# Patient Record
Sex: FEMALE | Race: BLACK OR AFRICAN AMERICAN | Employment: FULL TIME | URBAN - METROPOLITAN AREA
[De-identification: names, ages, dates, MRNs, and addresses within clinical notes are randomized per-mention and may not be internally consistent; named-entity substitution may affect disease eponyms.]

---

## 2017-03-17 ENCOUNTER — OFFICE VISIT (OUTPATIENT)
Dept: OBGYN CLINIC | Age: 27
End: 2017-03-17

## 2017-03-17 VITALS
WEIGHT: 204 LBS | SYSTOLIC BLOOD PRESSURE: 109 MMHG | DIASTOLIC BLOOD PRESSURE: 73 MMHG | HEART RATE: 96 BPM | BODY MASS INDEX: 34.83 KG/M2 | HEIGHT: 64 IN

## 2017-03-17 DIAGNOSIS — Z11.3 SCREENING FOR VENEREAL DISEASE: ICD-10-CM

## 2017-03-17 DIAGNOSIS — Z32.02 NEGATIVE PREGNANCY TEST: ICD-10-CM

## 2017-03-17 DIAGNOSIS — Z01.419 ENCOUNTER FOR WELL WOMAN EXAM WITH ROUTINE GYNECOLOGICAL EXAM: Primary | ICD-10-CM

## 2017-03-17 DIAGNOSIS — N92.6 IRREGULAR MENSES: ICD-10-CM

## 2017-03-17 LAB
HCG URINE, QL. (POC): NEGATIVE
VALID INTERNAL CONTROL?: YES

## 2017-03-17 RX ORDER — MEDROXYPROGESTERONE ACETATE 10 MG/1
10 TABLET ORAL DAILY
Qty: 10 TAB | Refills: 0 | Status: SHIPPED | OUTPATIENT
Start: 2017-03-17 | End: 2017-03-27

## 2017-03-17 RX ORDER — LEVONORGESTREL AND ETHINYL ESTRADIOL 0.15-0.03
1 KIT ORAL DAILY
Qty: 3 TAB | Refills: 4 | Status: SHIPPED | OUTPATIENT
Start: 2017-03-17

## 2017-03-17 NOTE — PATIENT INSTRUCTIONS

## 2017-03-17 NOTE — PROGRESS NOTES
Annual exam ages 21-44    Aida Cohn is a ,  32 y.o. female [address] Patient's last menstrual period was 2017., who presents for her annual checkup. Withdrawal bleed came at end of pill pack as expected. Ran out (overdue for AE). Previously was probably not taking OCPs consistently, doing better now. Would like to resume. Last IC 2wks ago. Since her last annual GYN exam about one year ago on 3/15/16, she has had the following changes in her health history: None    ADDITIONAL CONCERNS:  She is having amenorrhea. Ran out of OCP for this month. Did not call to refill. With regard to the Gardasil vaccine, she is older than the FDA approved age to receive it. Menstrual status:    Her periods are light in flow. She is using one to two pads or tampons per day, usually regular and last 26-30 days. She denies dysmenorrhea. She denies premenstrual symptoms. Contraception:    The current method of family planning is condoms sometimes. Sexual history:     She  reports that she currently engages in sexual activity and has had male partners. She reports using the following methods of birth control/protection: Condom and Pill. .        Pap and Mammogram History:    Her most recent Pap smear was normal, HPV was not indicated, obtained 2 year(s) ago on 1/20/15. She does not have a history of abnormal paps.     The patient has never had a mammogram.    Breast Cancer History/Substance Abuse:    Past Medical History:   Diagnosis Date    Asthma     Hx of trichomoniasis Treated at age 12    Pap smear for cervical cancer screening 1/20/15    negative (media tab)    Trichomonas infection 2016    Dx'd at [facility] and was given Rx- Flagyl     Past Surgical History:   Procedure Laterality Date    HX HYSTEROSCOPY  16    Hysteroscopy, D and C, and polypectomy with     OB History    Para Term  AB SAB TAB Ectopic Multiple Living   0 0 0 0 0 0 0 0 0 0             Current Outpatient Prescriptions   Medication Sig Dispense Refill    medroxyPROGESTERone (PROVERA) 10 mg tablet Take 1 Tab by mouth daily for 10 days. 10 Tab 0    levonorgestrel-ethinyl estradiol (ANIBAL) 0.15-0.03 mg tab Take 1 Tab by mouth daily. 3 Tab 4    ASPIRIN/SALICYLAMIDE/CAFFEINE (BC HEADACHE POWDER PO) Take 1 Package by mouth. Takes for headache or menstrual cramps      VENTOLIN HFA 90 mcg/actuation inhaler   0    metroNIDAZOLE (FLAGYL) 500 mg tablet Take 4 tabs by mouth for 1 dose. 4 Tab 0    metroNIDAZOLE (FLAGYL) 500 mg tablet take 1 tablet by mouth every 12 hours -NO ALCOHOL WHILE TAKING MEDICINE  0    oxyCODONE-acetaminophen (PERCOCET) 5-325 mg per tablet Take 1-2 Tabs by mouth every four (4) hours as needed for Pain. Max Daily Amount: 12 Tabs. 5 Tab 0    ibuprofen (MOTRIN) 800 mg tablet   0     Allergies: Penicillins   Social History     Social History    Marital status: SINGLE     Spouse name: N/A    Number of children: N/A    Years of education: N/A     Occupational History    Not on file. Social History Main Topics    Smoking status: Former Smoker     Types: Cigarettes    Smokeless tobacco: Never Used      Comment: Never used vapor or e-cigs     Alcohol use 0.6 oz/week     1 Cans of beer per week      Comment: occasional    Drug use: No    Sexual activity: Yes     Partners: Male     Birth control/ protection: Condom, Pill     Other Topics Concern    Not on file     Social History Narrative     Tobacco History:  reports that she has quit smoking. Her smoking use included Cigarettes. She has never used smokeless tobacco.  Alcohol Abuse:  reports that she drinks about 0.6 oz of alcohol per week   Drug Abuse:  reports that she does not use illicit drugs. There is no problem list on file for this patient.     Family History   Problem Relation Age of Onset    Cancer Father      Throat CA       Review of Systems - History obtained from the patient  Constitutional: negative for weight loss, fever, night sweats  HEENT: negative for hearing loss, earache, congestion, snoring, sorethroat  CV: negative for chest pain, palpitations, edema  Resp: negative for cough, shortness of breath, wheezing  GI: negative for change in bowel habits, abdominal pain, black or bloody stools  : negative for frequency, dysuria, hematuria, vaginal discharge  MSK: negative for back pain, joint pain, muscle pain  Breast: negative for breast lumps, nipple discharge, galactorrhea  Skin :negative for itching, rash, hives  Neuro: negative for dizziness, headache, confusion, weakness  Psych: negative for anxiety, depression, change in mood  Heme/lymph: negative for bleeding, bruising, pallor    Physical Exam    Visit Vitals    /73    Pulse 96    Ht 5' 4\" (1.626 m)    Wt 204 lb (92.5 kg)    LMP 02/03/2017  Comment: Until 2/6/17    BMI 35.02 kg/m2       Constitutional  · Appearance: well-nourished, well developed, alert, in no acute distress    HENT  · Head and Face: appears normal    Neck  · Inspection/Palpation: normal appearance, no masses or tenderness  · Lymph Nodes: no lymphadenopathy present  · Thyroid: gland size normal, nontender, no nodules or masses present on palpation    Chest  · Respiratory Effort: breathing unlabored  · Auscultation: normal breath sounds    Cardiovascular  · Heart:  · Auscultation: regular rate and rhythm without murmur    Breasts  · Inspection of Breasts: breasts symmetrical, no skin changes, no discharge present, nipple appearance normal, no skin retraction present  · Palpation of Breasts and Axillae: no masses present on palpation, no breast tenderness  · Axillary Lymph Nodes: no lymphadenopathy present    Gastrointestinal  · Abdominal Examination: abdomen non-tender to palpation, normal bowel sounds, no masses present  · Liver and spleen: no hepatomegaly present, spleen not palpable  · Hernias: no hernias identified    Genitourinary  · External Genitalia: normal appearance for age, no discharge present, no tenderness present, no inflammatory lesions present, no masses present, no atrophy present  · Vagina: normal vaginal vault without central or paravaginal defects, no discharge present, no inflammatory lesions present, no masses present  · Bladder: non-tender to palpation  · Urethra: appears normal  · Cervix: normal   · Uterus: normal size, shape and consistency  · Adnexa: no adnexal tenderness present, no adnexal masses present  · Perineum: perineum within normal limits, no evidence of trauma, no rashes or skin lesions present  · Anus: anus within normal limits, no hemorrhoids present  · Inguinal Lymph Nodes: no lymphadenopathy present    Skin  · General Inspection: no rash, no lesions identified    Neurologic/Psychiatric  · Mental Status:  · Orientation: grossly oriented to person, place and time  · Mood and Affect: mood normal, affect appropriate      Results for orders placed or performed in visit on 03/17/17   AMB POC URINE PREGNANCY TEST, VISUAL COLOR COMPARISON   Result Value Ref Range    VALID INTERNAL CONTROL POC Yes     HCG urine, Ql. (POC) Negative Negative         Assessment & Plan:  · Routine gynecologic examination. Pap done. · Oligomenorrhea. Last IC >2wks ago. eRX Provera x10d, then start OCPs. 1st Sunday start, backup method until second pack. Aureliano Sana #3 RFx4  · H/o +trich, NuSwab today. · Will draw HIV, T.pallidum, HBsAg, HepC.   · Counseled re: diet, exercise, healthy lifestyle  · Return for yearly wellness visits  · Patient verbalized understanding    Orders Placed This Encounter    CT/NG/T.VAGINALIS AMPLIFICATION     Order Specific Question:   Specimen type     Answer:   Vaginal [516]    HEP B SURFACE AG    HEPATITIS C AB    T PALLIDUM SCREEN W/REFLEX    HIV 1/2 AG/AB, 4TH GENERATION,W RFLX CONFIRM    AMB POC URINE PREGNANCY TEST, VISUAL COLOR COMPARISON    medroxyPROGESTERone (PROVERA) 10 mg tablet     Sig: Take 1 Tab by mouth daily for 10 days. Dispense:  10 Tab     Refill:  0    levonorgestrel-ethinyl estradiol (ANIBAL) 0.15-0.03 mg tab     Sig: Take 1 Tab by mouth daily.      Dispense:  3 Tab     Refill:  4    PAP, IG, RFX HPV ASCUS ([id number])

## 2017-03-20 LAB
C TRACH RRNA SPEC QL NAA+PROBE: NEGATIVE
N GONORRHOEA RRNA SPEC QL NAA+PROBE: NEGATIVE
T VAGINALIS RRNA SPEC QL NAA+PROBE: NEGATIVE

## 2017-03-21 LAB
HBV SURFACE AG SERPL QL IA: NEGATIVE
HCV AB S/CO SERPL IA: 0.2 S/CO RATIO (ref 0–0.9)
HIV 1+2 AB+HIV1 P24 AG SERPL QL IA: NON REACTIVE
T PALLIDUM AB SER QL IA: NEGATIVE

## 2017-03-22 LAB
CYTOLOGIST CVX/VAG CYTO: NORMAL
CYTOLOGY CVX/VAG DOC THIN PREP: NORMAL
DX ICD CODE: NORMAL
LABCORP, 190119: NORMAL
Lab: NORMAL
Lab: NORMAL
OTHER STN SPEC: NORMAL
PATH REPORT.FINAL DX SPEC: NORMAL
STAT OF ADQ CVX/VAG CYTO-IMP: NORMAL

## 2017-05-08 ENCOUNTER — OFFICE VISIT (OUTPATIENT)
Dept: OBGYN CLINIC | Age: 27
End: 2017-05-08

## 2017-05-08 VITALS
BODY MASS INDEX: 35 KG/M2 | SYSTOLIC BLOOD PRESSURE: 98 MMHG | DIASTOLIC BLOOD PRESSURE: 71 MMHG | WEIGHT: 205 LBS | HEIGHT: 64 IN | HEART RATE: 107 BPM

## 2017-05-08 DIAGNOSIS — R10.12 LEFT UPPER QUADRANT PAIN: ICD-10-CM

## 2017-05-08 DIAGNOSIS — R31.9 HEMATURIA: ICD-10-CM

## 2017-05-08 DIAGNOSIS — M54.50 BILATERAL LOW BACK PAIN WITHOUT SCIATICA, UNSPECIFIED CHRONICITY: ICD-10-CM

## 2017-05-08 DIAGNOSIS — R10.31 ABDOMINAL PAIN, BILATERAL LOWER QUADRANT: Primary | ICD-10-CM

## 2017-05-08 DIAGNOSIS — R10.32 ABDOMINAL PAIN, BILATERAL LOWER QUADRANT: Primary | ICD-10-CM

## 2017-05-08 LAB
BILIRUB UR QL STRIP: NEGATIVE
GLUCOSE UR-MCNC: NEGATIVE MG/DL
KETONES P FAST UR STRIP-MCNC: NEGATIVE MG/DL
PH UR STRIP: 4.6 [PH] (ref 4.6–8)
PROT UR QL STRIP: NEGATIVE MG/DL
SP GR UR STRIP: 1 (ref 1–1.03)
UA UROBILINOGEN AMB POC: NORMAL (ref 0.2–1)
URINALYSIS CLARITY POC: NORMAL
URINALYSIS COLOR POC: NORMAL
URINE BLOOD POC: NORMAL
URINE LEUKOCYTES POC: NEGATIVE
URINE NITRITES POC: NEGATIVE

## 2017-05-08 NOTE — PROGRESS NOTES
UTI note    Bushra Galvan is a [de-identified] ,  32 y.o. female [address] who presents today with had concerns including Bladder Infection. .     Her symptoms started 2+ weeks ago, unchanged since that time. Abdominal pain. Has been BLQ. Today RICO and LUQ. Urine is darker color. Feels that she drinks a lot of water. Patient's other complaints: back pain. Her symptoms are moderate. Low back pain. Feels achey. +diarrhea last cople of days. Patient denies fever, chills, N/V. No vaginal discharge, itching, odor. There is not any concern of sexual abuse. There is not a history of trauma to the genital area. Patient does not have a history of recurrent UTI. She does not have a history of pyelonephritis. She has a history of  has a past medical history of Asthma; Pap smear for cervical cancer screening (03/17/2017); and Trichomonas infection (12/13/2016). with the following surgical history  has a past surgical history that includes hysteroscopy (5/4/16). .  . She has not been evaluated for her current complaints. Has appt scheduled with her PCP this Wednesday (2d)    Urine dipstick shows positive for red blood cells. No results found for this or any previous visit. Past Medical History:   Diagnosis Date    Asthma     Pap smear for cervical cancer screening 03/17/2017    Negative (no hpv)     Trichomonas infection 12/13/2016    Dx'd at [facility] and was given Rx- Flagyl. Prev tx'd @ 17yo. Past Surgical History:   Procedure Laterality Date    HX HYSTEROSCOPY  5/4/16    Hysteroscopy, D and C, and polypectomy with     Social History     Occupational History    Not on file.      Social History Main Topics    Smoking status: Current Some Day Smoker     Types: Cigarettes    Smokeless tobacco: Never Used      Comment: Never used vapor or e-cigs     Alcohol use 0.6 oz/week     1 Cans of beer per week      Comment: occasional    Drug use: No    Sexual activity: Yes Partners: Male     Birth control/ protection: Condom, Pill     Family History   Problem Relation Age of Onset    Cancer Father      Throat CA       Allergies   Allergen Reactions    Penicillins Unable to Obtain     Yeast infections     Prior to Admission medications    Medication Sig Start Date End Date Taking? Authorizing Provider   levonorgestrel-ethinyl estradiol (ANIBAL) 0.15-0.03 mg tab Take 1 Tab by mouth daily. 3/17/17  Yes [address] MD   ASPIRIN/SALICYLAMIDE/CAFFEINE (BC HEADACHE POWDER PO) Take 1 Package by mouth. Takes for headache or menstrual cramps   Yes Historical Provider   VENTOLIN HFA 90 mcg/actuation inhaler  1/8/16  Yes Historical Provider   metroNIDAZOLE (FLAGYL) 500 mg tablet Take 4 tabs by mouth for 1 dose. 12/21/16   [address] MD   metroNIDAZOLE (FLAGYL) 500 mg tablet take 1 tablet by mouth every 12 hours -NO ALCOHOL WHILE TAKING MEDICINE 12/13/16   Historical Provider   oxyCODONE-acetaminophen (PERCOCET) 5-325 mg per tablet Take 1-2 Tabs by mouth every four (4) hours as needed for Pain. Max Daily Amount: 12 Tabs. 5/4/16   [address], MD   ibuprofen (MOTRIN) 800 mg tablet  1/13/16   Historical Provider        Review of Systems: History obtained from the patient  Constitutional: negative for weight loss, fever, night sweats  Breast: negative for breast lumps, nipple discharge, galactorrhea  GI: see HPI  : see HPI, negative for vaginal discharge  MSK:see HPI  Skin: negative for itching, rash, hives  Psych: negative for anxiety, depression, change in mood      Objective:    Visit Vitals    BP 98/71    Pulse (!) 107    Ht 5' 4\" (1.626 m)    Wt 205 lb (93 kg)    LMP 04/12/2017    BMI 35.19 kg/m2       Physical Exam:   PHYSICAL EXAMINATION    Constitutional  · Appearance: well-nourished, well developed, alert, in no acute distress    Gastrointestinal  · Abdominal Examination: mild RICO and LUQ tenderness to palpation. No guarding or rebound.  Normal bowel sounds, no masses present  · Liver and spleen: no hepatomegaly present, spleen not palpable  · Hernias: no hernias identified      Back  · No CVAT; minimal flank tenderness bilaterally (\"achey\")    Genitourinary  · External Genitalia: normal appearance for age, no discharge present, no tenderness present, no inflammatory lesions present, no masses present, no atrophy present  · Vagina: normal vaginal vault without central or paravaginal defects, no discharge present, no inflammatory lesions present, no masses present  · Bladder: mildly tender to palpation  · Urethra: appears normal  · Cervix: normal, no CMT   · Uterus: normal size, shape and consistency; RV, +/- minimal tenderness (initially reported some tenderness to palpation, but not able to reproduce)  · Adnexa: no adnexal tenderness present, no adnexal masses present  · Perineum: perineum within normal limits, no evidence of trauma, no rashes or skin lesions present  · Anus: anus within normal limits, no hemorrhoids present  · Inguinal Lymph Nodes: no lymphadenopathy present    Skin  · General Inspection: no rash, no lesions identified    Neurologic/Psychiatric  · Mental Status:  · Orientation: grossly oriented to person, place and time  · Mood and Affect: mood normal, affect appropriate    Results for orders placed or performed in visit on 05/08/17   AMB POC URINALYSIS DIP STICK AUTO W/O MICRO   Result Value Ref Range    Color (UA POC) Bev     Clarity (UA POC) Slightly Cloudy     Glucose (UA POC) Negative Negative    Bilirubin (UA POC) Negative Negative    Ketones (UA POC) Negative Negative    Specific gravity (UA POC) 1.001 1.001 - 1.035    Blood (UA POC) Trace Negative    pH (UA POC) 4.6 4.6 - 8.0    Protein (UA POC) Negative Negative mg/dL    Urobilinogen (UA POC) normal 0.2 - 1    Nitrites (UA POC) Negative Negative    Leukocyte esterase (UA POC) Negative Negative         Assessment:   Abdominal pain  Back pain    Plan:   - UA/C&S  - Nuswab G/C/T

## 2017-05-08 NOTE — PATIENT INSTRUCTIONS
Urinary Tract Infection in Women: Care Instructions  Your Care Instructions    A urinary tract infection, or UTI, is a general term for an infection anywhere between the kidneys and the urethra (where urine comes out). Most UTIs are bladder infections. They often cause pain or burning when you urinate. UTIs are caused by bacteria and can be cured with antibiotics. Be sure to complete your treatment so that the infection goes away. Follow-up care is a key part of your treatment and safety. Be sure to make and go to all appointments, and call your doctor if you are having problems. It's also a good idea to know your test results and keep a list of the medicines you take. How can you care for yourself at home? · Take your antibiotics as directed. Do not stop taking them just because you feel better. You need to take the full course of antibiotics. · Drink extra water and other fluids for the next day or two. This may help wash out the bacteria that are causing the infection. (If you have kidney, heart, or liver disease and have to limit fluids, talk with your doctor before you increase your fluid intake.)  · Avoid drinks that are carbonated or have caffeine. They can irritate the bladder. · Urinate often. Try to empty your bladder each time. · To relieve pain, take a hot bath or lay a heating pad set on low over your lower belly or genital area. Never go to sleep with a heating pad in place. To prevent UTIs  · Drink plenty of water each day. This helps you urinate often, which clears bacteria from your system. (If you have kidney, heart, or liver disease and have to limit fluids, talk with your doctor before you increase your fluid intake.)  · Urinate when you need to. · Urinate right after you have sex. · Change sanitary pads often. · Avoid douches, bubble baths, feminine hygiene sprays, and other feminine hygiene products that have deodorants.   · After going to the bathroom, wipe from front to back.  When should you call for help? Call your doctor now or seek immediate medical care if:  · Symptoms such as fever, chills, nausea, or vomiting get worse or appear for the first time. · You have new pain in your back just below your rib cage. This is called flank pain. · There is new blood or pus in your urine. · You have any problems with your antibiotic medicine. Watch closely for changes in your health, and be sure to contact your doctor if:  · You are not getting better after taking an antibiotic for 2 days. · Your symptoms go away but then come back. Where can you learn more? Go to http://peter-danilo.info/. Enter X420 in the search box to learn more about \"Urinary Tract Infection in Women: Care Instructions. \"  Current as of: November 28, 2016  Content Version: 11.2  © 1479-3231 [email or web address], [employer]. Care instructions adapted under license by [employer] (which disclaims liability or warranty for this information). If you have questions about a medical condition or this instruction, always ask your healthcare professional. [address] any warranty or liability for your use of this information.

## 2017-05-10 ENCOUNTER — OFFICE VISIT (OUTPATIENT)
Dept: FAMILY MEDICINE CLINIC | Age: 27
End: 2017-05-10

## 2017-05-10 VITALS
WEIGHT: 209 LBS | RESPIRATION RATE: 16 BRPM | HEIGHT: 64 IN | DIASTOLIC BLOOD PRESSURE: 78 MMHG | HEART RATE: 80 BPM | OXYGEN SATURATION: 98 % | TEMPERATURE: 98.2 F | SYSTOLIC BLOOD PRESSURE: 105 MMHG | BODY MASS INDEX: 35.68 KG/M2

## 2017-05-10 DIAGNOSIS — R14.1 ABDOMINAL GAS PAIN: Primary | ICD-10-CM

## 2017-05-10 DIAGNOSIS — Z76.89 ENCOUNTER TO ESTABLISH CARE: ICD-10-CM

## 2017-05-10 DIAGNOSIS — Z01.419 ROUTINE GYNECOLOGICAL EXAMINATION: ICD-10-CM

## 2017-05-10 LAB
APPEARANCE UR: CLEAR
BACTERIA UR CULT: NORMAL
BILIRUB UR QL STRIP: NEGATIVE
C TRACH RRNA SPEC QL NAA+PROBE: NEGATIVE
COLOR UR: YELLOW
GLUCOSE UR QL: NEGATIVE
HGB UR QL STRIP: NEGATIVE
KETONES UR QL STRIP: NEGATIVE
LEUKOCYTE ESTERASE UR QL STRIP: NEGATIVE
MICRO URNS: NORMAL
N GONORRHOEA RRNA SPEC QL NAA+PROBE: NEGATIVE
NITRITE UR QL STRIP: NEGATIVE
PH UR STRIP: 7 [PH] (ref 5–7.5)
PROT UR QL STRIP: NORMAL
SP GR UR: 1.03 (ref 1–1.03)
T VAGINALIS RRNA SPEC QL NAA+PROBE: NEGATIVE
UROBILINOGEN UR STRIP-MCNC: 1 MG/DL (ref 0.2–1)

## 2017-05-10 NOTE — MR AVS SNAPSHOT
Visit Information Date & Time Provider Department Dept. Phone Encounter #  
 5/10/2017  9:00 AM Orlando Crouch Donnie [phone] [id number] Follow-up Instructions Return if symptoms worsen or fail to improve. Your Appointments 3/19/2018  9:00 AM  
ESTABLISHED PATIENT with Camron Agarwal MD  
Gerson Gagnon ([facility]) Appt Note: AE  
 [address] [facility] [address] [facility] [id number]  
[facility] [address] Upcoming Health Maintenance Date Due  
 HPV AGE 9Y-34Y (1 of 3 - Female 3 Dose Series) 5/28/2001 Pneumococcal 19-64 Medium Risk (1 of 1 - PPSV23) 5/28/2009 DTaP/Tdap/Td series (1 - Tdap) 5/28/2011 INFLUENZA AGE 9 TO ADULT 8/1/2017 PAP AKA CERVICAL CYTOLOGY 3/17/2020 Allergies as of 5/10/2017  Review Complete On: 5/10/2017 By: Emiliano Snow MD  
  
 Severity Noted Reaction Type Reactions Penicillins Medium 03/10/2016   Side Effect Unable to Obtain Yeast infections Current Immunizations  Never Reviewed No immunizations on file. Not reviewed this visit You Were Diagnosed With   
  
 Codes Comments Abdominal gas pain    -  Primary ICD-10-CM: R14.1 ICD-9-CM: 787.3 Routine gynecological examination     ICD-10-CM: P58.667 ICD-9-CM: V72.31 Encounter to establish care     ICD-10-CM: Z76.89 
ICD-9-CM: V65.8 Vitals BP Pulse Temp Resp Height(growth percentile) Weight(growth percentile) 105/78 (BP 1 Location: Left arm, BP Patient Position: Sitting) 80 98.2 °F (36.8 °C) (Oral) 16 5' 4\" (1.626 m) 209 lb (94.8 kg) LMP SpO2 BMI OB Status Smoking Status 04/12/2017 98% 35.87 kg/m2 Having regular periods Current Some Day Smoker Vitals History BMI and BSA Data Body Mass Index Body Surface Area  
 35.87 kg/m 2 2.07 m 2 Preferred Pharmacy Pharmacy Name Phone [address] [facility]  [phone] Your Updated Medication List  
  
   
This list is accurate as of: 5/10/17  9:30 AM.  Always use your most recent med list.  
  
  
  
  
 BC HEADACHE POWDER PO Take 1 Package by mouth. Takes for headache or menstrual cramps  
  
 levonorgestrel-ethinyl estradiol 0.15-0.03 mg Tab Commonly known as:  Patricia Homme Take 1 Tab by mouth daily. VENTOLIN HFA 90 mcg/actuation inhaler Generic drug:  albuterol We Performed the Following REFERRAL TO GYNECOLOGY [REF30 Custom] Follow-up Instructions Return if symptoms worsen or fail to improve. Referral Information Referral ID Referred By Referred To  
  
 [id number] Lamar Vazquez MD   
   [facility] [address] [facility], [address] [place] [address] Pky Phone: [phone] Fax: [phone] Visits Status Start Date End Date 1 New Request 5/10/17 5/10/18 If your referral has a status of pending review or denied, additional information will be sent to support the outcome of this decision. Patient Instructions Simethicone - also known as Gas X. May try to see if this helps with your gas pains. Abdominal Pain: Care Instructions Your Care Instructions Abdominal pain has many possible causes. Some aren't serious and get better on their own in a few days. Others need more testing and treatment. If your pain continues or gets worse, you need to be rechecked and may need more tests to find out what is wrong. You may need surgery to correct the problem. Don't ignore new symptoms, such as fever, nausea and vomiting, urination problems, pain that gets worse, and dizziness. These may be signs of a more serious problem. Your doctor may have recommended a follow-up visit in the next 8 to 12 hours. If you are not getting better, you may need more tests or treatment. The doctor has checked you carefully, but problems can develop later. If you notice any problems or new symptoms, get medical treatment right away. Follow-up care is a key part of your treatment and safety. Be sure to make and go to all appointments, and call your doctor if you are having problems. It's also a good idea to know your test results and keep a list of the medicines you take. How can you care for yourself at home? · Rest until you feel better. · To prevent dehydration, drink plenty of fluids, enough so that your urine is light yellow or clear like water. Choose water and other caffeine-free clear liquids until you feel better. If you have kidney, heart, or liver disease and have to limit fluids, talk with your doctor before you increase the amount of fluids you drink. · If your stomach is upset, eat mild foods, such as rice, dry toast or crackers, bananas, and applesauce. Try eating several small meals instead of two or three large ones. · Wait until 48 hours after all symptoms have gone away before you have spicy foods, alcohol, and drinks that contain caffeine. · Do not eat foods that are high in fat. · Avoid anti-inflammatory medicines such as aspirin, ibuprofen (Advil, Motrin), and naproxen (Aleve). These can cause stomach upset. Talk to your doctor if you take daily aspirin for another health problem. When should you call for help? Call 911 anytime you think you may need emergency care. For example, call if: 
· You passed out (lost consciousness). · You pass maroon or very bloody stools. · You vomit blood or what looks like coffee grounds. · You have new, severe belly pain. Call your doctor now or seek immediate medical care if: 
· Your pain gets worse, especially if it becomes focused in one area of your belly. · You have a new or higher fever. · Your stools are black and look like tar, or they have streaks of blood. · You have unexpected vaginal bleeding. · You have symptoms of a urinary tract infection. These may include: 
¨ Pain when you urinate. ¨ Urinating more often than usual. 
¨ Blood in your urine. · You are dizzy or lightheaded, or you feel like you may faint. Watch closely for changes in your health, and be sure to contact your doctor if: 
· You are not getting better after 1 day (24 hours). Where can you learn more? Go to http://peter-danilo.info/. Enter W606 in the search box to learn more about \"Abdominal Pain: Care Instructions. \" Current as of: May 27, 2016 Content Version: 11.2 © 2095-8344 oNoise. Care instructions adapted under license by [employer] (which disclaims liability or warranty for this information). If you have questions about a medical condition or this instruction, always ask your healthcare professional. [address] any warranty or liability for your use of this information. Introducing [facility] [place] [facility]! Dear Ricardo Gonzalez: Thank you for requesting a [employer] account. Our records indicate that you already have an active [employer] account. You can access your account anytime at [email or web address] Did you know that you can access your hospital and ER discharge instructions at any time in [employer]? You can also review all of your test results from your hospital stay or ER visit. Additional Information If you have questions, please visit the Frequently Asked Questions section of the [employer] website at [email or web address] Remember, [employer] is NOT to be used for urgent needs. For medical emergencies, dial 911. Now available from your iPhone and Android! Please provide this summary of care documentation to your next provider. Your primary care clinician is listed as Tati Reyes. If you have any questions after today's visit, please call [phone].

## 2017-05-10 NOTE — PROGRESS NOTES
Subjective:      Meseret López is a 32 y.o. female here today to establish care and needs referral to Gynecology. States that she was informed by her Gynecologist's office that a referral from her PCP is needed. In addition. Patient reports that for the past 3 days she has had \"gas stuck on my stomach\". Intermittent. She is passing flatus. Located in the lower part of her stomach. Episodes will last for 30 seconds before self-resolving. Associated with constipation; reports that her last BM was yesterday morning. Reports that she typically has a bowel movement each morning which were easy to pass. She reports that she drinks plenty of water. Evaluated by on 5/8/17 by Dr. Chente Cox with negative urine culture and STI testing. She is eating and drinking well. Denies fever, chills, weight loss, nausea, vomiting. Current Outpatient Prescriptions   Medication Sig Dispense Refill    levonorgestrel-ethinyl estradiol (ANIBAL) 0.15-0.03 mg tab Take 1 Tab by mouth daily. 3 Tab 4    ASPIRIN/SALICYLAMIDE/CAFFEINE (BC HEADACHE POWDER PO) Take 1 Package by mouth. Takes for headache or menstrual cramps      VENTOLIN HFA 90 mcg/actuation inhaler   0       Allergies   Allergen Reactions    Penicillins Unable to Obtain     Yeast infections       Past Medical History:   Diagnosis Date    Asthma     Pap smear for cervical cancer screening 03/17/2017    Negative (no hpv)     Trichomonas infection 12/13/2016    Dx'd at [facility] and was given Rx- Flagyl. Prev tx'd @ 15yo. Social History   Substance Use Topics    Smoking status: Current Some Day Smoker     Types: Cigarettes    Smokeless tobacco: Never Used      Comment: Never used vapor or e-cigs     Alcohol use 0.6 oz/week     1 Cans of beer per week      Comment: occasional        Review of Systems  Pertinent items are noted in HPI.      Objective:     Visit Vitals    /78 (BP 1 Location: Left arm, BP Patient Position: Sitting)    Pulse 80    Temp 98.2 °F (36.8 °C) (Oral)    Resp 16    Ht 5' 4\" (1.626 m)    Wt 209 lb (94.8 kg)    LMP 04/12/2017    SpO2 98%    BMI 35.87 kg/m2      General appearance - alert, well appearing, and in no distress  Eyes - pupils equal and reactive, extraocular eye movements intact, sclera anicteric  Chest - clear to auscultation, no wheezes, rales or rhonchi, symmetric air entry, no tachypnea, retractions or cyanosis  Heart - normal rate, regular rhythm, normal S1, S2, no murmurs, rubs, clicks or gallops  Abdomen - soft, nontender, nondistended, no masses or organomegaly, normoactive bowel sounds   Neurological - alert, oriented, normal speech, no focal findings or movement disorder noted  Skin - warm and dry, normal color and turgor     Assessment/Plan:   Ebony Holcomb is a 32 y.o. female seen for:     1. Abdominal gas pain: no associated worrisome history or examination findings at this time. Trial of simethicone therapy to see if this helps alleviate her symptoms. Return for re-evaluation if symptoms persist or worsen. 2. Routine gynecological examination: has established care with Dr. Prateek Wade at [facility]. Referral provided. - REFERRAL TO GYNECOLOGY    3. Encounter to establish care    I have discussed the diagnosis with the patient and the intended plan as seen in the above orders. The patient has received an after-visit summary and questions were answered concerning future plans. I have discussed medication side effects and warnings with the patient as well. Patient verbalizes understanding of plan of care and denies further questions or concerns at this time. Informed patient to return to the office if symptoms worsen or if new symptoms arise. Follow-up Disposition:  Return if symptoms worsen or fail to improve.

## 2017-05-10 NOTE — PATIENT INSTRUCTIONS
Simethicone - also known as Gas X. May try to see if this helps with your gas pains. Abdominal Pain: Care Instructions  Your Care Instructions    Abdominal pain has many possible causes. Some aren't serious and get better on their own in a few days. Others need more testing and treatment. If your pain continues or gets worse, you need to be rechecked and may need more tests to find out what is wrong. You may need surgery to correct the problem. Don't ignore new symptoms, such as fever, nausea and vomiting, urination problems, pain that gets worse, and dizziness. These may be signs of a more serious problem. Your doctor may have recommended a follow-up visit in the next 8 to 12 hours. If you are not getting better, you may need more tests or treatment. The doctor has checked you carefully, but problems can develop later. If you notice any problems or new symptoms, get medical treatment right away. Follow-up care is a key part of your treatment and safety. Be sure to make and go to all appointments, and call your doctor if you are having problems. It's also a good idea to know your test results and keep a list of the medicines you take. How can you care for yourself at home? · Rest until you feel better. · To prevent dehydration, drink plenty of fluids, enough so that your urine is light yellow or clear like water. Choose water and other caffeine-free clear liquids until you feel better. If you have kidney, heart, or liver disease and have to limit fluids, talk with your doctor before you increase the amount of fluids you drink. · If your stomach is upset, eat mild foods, such as rice, dry toast or crackers, bananas, and applesauce. Try eating several small meals instead of two or three large ones. · Wait until 48 hours after all symptoms have gone away before you have spicy foods, alcohol, and drinks that contain caffeine. · Do not eat foods that are high in fat.   · Avoid anti-inflammatory medicines such as aspirin, ibuprofen (Advil, Motrin), and naproxen (Aleve). These can cause stomach upset. Talk to your doctor if you take daily aspirin for another health problem. When should you call for help? Call 911 anytime you think you may need emergency care. For example, call if:  · You passed out (lost consciousness). · You pass maroon or very bloody stools. · You vomit blood or what looks like coffee grounds. · You have new, severe belly pain. Call your doctor now or seek immediate medical care if:  · Your pain gets worse, especially if it becomes focused in one area of your belly. · You have a new or higher fever. · Your stools are black and look like tar, or they have streaks of blood. · You have unexpected vaginal bleeding. · You have symptoms of a urinary tract infection. These may include:  ¨ Pain when you urinate. ¨ Urinating more often than usual.  ¨ Blood in your urine. · You are dizzy or lightheaded, or you feel like you may faint. Watch closely for changes in your health, and be sure to contact your doctor if:  · You are not getting better after 1 day (24 hours). Where can you learn more? Go to http://peter-danilo.info/. Enter L875 in the search box to learn more about \"Abdominal Pain: Care Instructions. \"  Current as of: May 27, 2016  Content Version: 11.2  © 3620-5617 MobiPix[employer]. Care instructions adapted under license by [employer] (which disclaims liability or warranty for this information). If you have questions about a medical condition or this instruction, always ask your healthcare professional. [address] any warranty or liability for your use of this information.

## 2017-05-10 NOTE — PROGRESS NOTES
Chief Complaint   Patient presents with    New Patient     previously saw Dr. Thalia Velez at [facility]    Abdominal Pain     pt states she feels like she has gas stuck in her stomach for the last 3 days.  Documentation     pt states she needs a referral to Dr. Caden Doan. she was seen on 5/8/17 by her     \"REVIEWED RECORD IN PREPARATION FOR VISIT AND HAVE OBTAINED THE NECESSARY DOCUMENTATION\"  1. Have you been to the ER, urgent care clinic since your last visit? Hospitalized since your last visit? No    2. Have you seen or consulted any other health care providers outside of the [address] [place] [address] since your last visit? Include any pap smears or colon screening. No  Patient does not have advanced directives.

## 2017-12-03 ENCOUNTER — HOSPITAL ENCOUNTER (EMERGENCY)
Age: 27
Discharge: HOME | End: 2017-12-03
Attending: EMERGENCY MEDICINE
Payer: SELF-PAY

## 2017-12-03 VITALS
TEMPERATURE: 98.7 F | OXYGEN SATURATION: 100 % | HEART RATE: 93 BPM | RESPIRATION RATE: 20 BRPM | DIASTOLIC BLOOD PRESSURE: 77 MMHG | SYSTOLIC BLOOD PRESSURE: 120 MMHG

## 2017-12-03 DIAGNOSIS — N89.8 DISCHARGE FROM THE VAGINA: Primary | ICD-10-CM

## 2017-12-03 LAB
APPEARANCE UR: CLEAR
BACTERIA URNS QL MICRO: NEGATIVE /HPF
BILIRUB UR QL: NEGATIVE
CLUE CELLS VAG QL WET PREP: NORMAL
COLOR UR: NORMAL
EPITH CASTS URNS QL MICRO: NORMAL /LPF
GLUCOSE UR STRIP.AUTO-MCNC: NEGATIVE MG/DL
HCG UR QL: NEGATIVE
HGB UR QL STRIP: NEGATIVE
KETONES UR QL STRIP.AUTO: NEGATIVE MG/DL
KOH PREP SPEC: NORMAL
LEUKOCYTE ESTERASE UR QL STRIP.AUTO: NEGATIVE
NITRITE UR QL STRIP.AUTO: NEGATIVE
PH UR STRIP: 6 [PH] (ref 5–8)
PROT UR STRIP-MCNC: NEGATIVE MG/DL
RBC #/AREA URNS HPF: NORMAL /HPF (ref 0–5)
SERVICE CMNT-IMP: NORMAL
SP GR UR REFRACTOMETRY: 1.01 (ref 1–1.03)
T VAGINALIS VAG QL WET PREP: NORMAL
UA: UC IF INDICATED,UAUC: NORMAL
UROBILINOGEN UR QL STRIP.AUTO: 0.2 EU/DL (ref 0.2–1)
WBC URNS QL MICRO: NORMAL /HPF (ref 0–4)

## 2017-12-03 PROCEDURE — 81001 URINALYSIS AUTO W/SCOPE: CPT | Performed by: EMERGENCY MEDICINE

## 2017-12-03 PROCEDURE — 87210 SMEAR WET MOUNT SALINE/INK: CPT | Performed by: EMERGENCY MEDICINE

## 2017-12-03 PROCEDURE — 96372 THER/PROPH/DIAG INJ SC/IM: CPT

## 2017-12-03 PROCEDURE — 74011000250 HC RX REV CODE- 250: Performed by: EMERGENCY MEDICINE

## 2017-12-03 PROCEDURE — 81025 URINE PREGNANCY TEST: CPT

## 2017-12-03 PROCEDURE — 74011250637 HC RX REV CODE- 250/637: Performed by: EMERGENCY MEDICINE

## 2017-12-03 PROCEDURE — 87591 N.GONORRHOEAE DNA AMP PROB: CPT | Performed by: EMERGENCY MEDICINE

## 2017-12-03 PROCEDURE — 99284 EMERGENCY DEPT VISIT MOD MDM: CPT

## 2017-12-03 PROCEDURE — 74011250636 HC RX REV CODE- 250/636: Performed by: EMERGENCY MEDICINE

## 2017-12-03 RX ORDER — AZITHROMYCIN 250 MG/1
1000 TABLET, FILM COATED ORAL
Status: COMPLETED | OUTPATIENT
Start: 2017-12-03 | End: 2017-12-03

## 2017-12-03 RX ADMIN — LIDOCAINE HYDROCHLORIDE 1 G: 10 INJECTION, SOLUTION EPIDURAL; INFILTRATION; INTRACAUDAL; PERINEURAL at 03:33

## 2017-12-03 RX ADMIN — AZITHROMYCIN 1000 MG: 250 TABLET, FILM COATED ORAL at 03:33

## 2017-12-03 NOTE — ED PROVIDER NOTES
EMERGENCY DEPARTMENT HISTORY AND PHYSICAL EXAM      Date: 12/3/2017  Patient Name: Will Andrade    History of Presenting Illness     Chief Complaint   Patient presents with    Vaginal Discharge     Yellow vaginal discharge x 3 days with pelvic pain       History Provided By: Patient    HPI: Will Andrade, 32 y.o. female with PMHx significant for asthma, presents ambulatory to the ED with cc of progressively worsening, mild to moderate pelvic pain x a few days. Pt states her pain radiates to her back. She also presents with associated NVD, urinary frequency, and vaginal discharge. She denies any alleviating or exacerbating factors. She denies any chance of pregnancy. She notes she is on her period now. She denies any recent Abx use. She denies taking any daily medications. She denies any other complaints. PCP: Raine Yousif MD    There are no other complaints, changes, or physical findings at this time. Current Outpatient Prescriptions   Medication Sig Dispense Refill    levonorgestrel-ethinyl estradiol (ANIBAL) 0.15-0.03 mg tab Take 1 Tab by mouth daily. 3 Tab 4    ASPIRIN/SALICYLAMIDE/CAFFEINE (BC HEADACHE POWDER PO) Take 1 Package by mouth. Takes for headache or menstrual cramps      VENTOLIN HFA 90 mcg/actuation inhaler   0       Past History     Past Medical History:  Past Medical History:   Diagnosis Date    Asthma     Pap smear for cervical cancer screening 03/17/2017    Negative (no hpv)     Trichomonas infection 12/13/2016    Dx'd at [facility] and was given Rx- Flagyl. Prev tx'd @ 17yo.        Past Surgical History:  Past Surgical History:   Procedure Laterality Date    HX GYN      D&C done by Dr. Precious Nathan HYSTEROSCOPY  5/4/16    Hysteroscopy, D and C, and polypectomy with       Family History:  Family History   Problem Relation Age of Onset    Cancer Father      Throat CA       Social History:  Social History   Substance Use Topics    Smoking status: Current Some Day Smoker     Types: Cigarettes    Smokeless tobacco: Never Used      Comment: Never used vapor or e-cigs     Alcohol use 0.6 oz/week     1 Cans of beer per week      Comment: occasional       Allergies: Allergies   Allergen Reactions    Penicillins Unable to Obtain     Yeast infections         Review of Systems   Review of Systems   General - Well, No disabling illness. Cardiovascular - No CP or KEN. Pulmonary - No SOB, cough or sputum. GI - N/V/D. No recent weight changes.  - Frequency. No dysuria. Vaginal discharge. Pelvic pain radiate to back. Musculoskeletal - No arthralgia or rheumatologic disease, no new lesions. HEME - No recent bleeding or easy bruising. Endocrine - No polyuria, polydipsia or polyphagia. Neurological - No seizures or fainting spells. Physical Exam   Physical Exam  General - well in NAD, no diaphoresis. HEENT- mucus membranes moist, posterior pharynx clear. Neck - supple, no adenopathy. Heart - Regular rate and rhythm. No murmurs, gallops or rubs. Lungs - clear to auscultation. No wheezes, rales, or rhonchi. Abdominal - soft, non-tender, non-distended, bowel sounds normal.  Back - No CVAT, no point tenderness or bony tenderness. No discoloration. MS - No arthritis or joint tenderness. Skin - No lesions noted. Neuro - Non-focal exam. Strength equal in all extremities.  - Pelvic Exam: No external lesions, moderate amount of white discharge. No CMT. No adnexal mass or tenderness.      Diagnostic Study Results     Labs -     Recent Results (from the past 12 hour(s))   HCG URINE, QL. - POC    Collection Time: 12/03/17  3:11 AM   Result Value Ref Range    Pregnancy test,urine (POC) NEGATIVE  NEG     URINALYSIS W/ REFLEX CULTURE    Collection Time: 12/03/17  3:12 AM   Result Value Ref Range    Color YELLOW/STRAW      Appearance CLEAR CLEAR      Specific gravity 1.010 1.003 - 1.030      pH (UA) 6.0 5.0 - 8.0      Protein NEGATIVE  NEG mg/dL    Glucose NEGATIVE  NEG mg/dL    Ketone NEGATIVE  NEG mg/dL    Bilirubin NEGATIVE  NEG      Blood NEGATIVE  NEG      Urobilinogen 0.2 0.2 - 1.0 EU/dL    Nitrites NEGATIVE  NEG      Leukocyte Esterase NEGATIVE  NEG      WBC 0-4 0 - 4 /hpf    RBC 0-5 0 - 5 /hpf    Epithelial cells FEW FEW /lpf    Bacteria NEGATIVE  NEG /hpf    UA:UC IF INDICATED CULTURE NOT INDICATED BY UA RESULT CNI     WET PREP    Collection Time: 12/03/17  3:19 AM   Result Value Ref Range    Clue cells CLUE CELLS ABSENT      Wet prep NO TRICHOMONAS SEEN     KOH, OTHER SOURCES    Collection Time: 12/03/17  3:19 AM   Result Value Ref Range    Special Requests: NO SPECIAL REQUESTS      KOH NO YEAST SEEN           Medical Decision Making   I am the first provider for this patient. I reviewed the vital signs, available nursing notes, past medical history, past surgical history, family history and social history. Vital Signs-Reviewed the patient's vital signs. Patient Vitals for the past 12 hrs:   Temp Pulse Resp BP SpO2   12/03/17 0257 98.7 °F (37.1 °C) 93 20 120/77 100 %       Records Reviewed: Nursing Notes    Provider Notes (Medical Decision Making):   DDx: GC, chlamydia, BV, yeast, trichomonas. ED Course:   Initial assessment performed. The patients presenting problems have been discussed, and they are in agreement with the care plan formulated and outlined with them. I have encouraged them to ask questions as they arise throughout their visit. Procedure Note - Pelvic Exam:    3:25 AM  Performed by: Renuka Worrell MD  Chaperoned by: Janeen Ku RN  Pelvic exam was performed using bimanual and speculum. Further findings noted in physical exam.   The procedure took 1-15 minutes, and pt tolerated well. Disposition:  DISCHARGE NOTE  4:21 AM  The patient has been re-evaluated and is ready for discharge. Reviewed available results with patient. Counseled pt on diagnosis and care plan. Pt has expressed understanding, and all questions have been answered.  Pt agrees with plan and agrees to follow up as recommended, or return to the ED if their symptoms worsen. Discharge instructions have been provided and explained to the pt, along with reasons to return to the ED. PLAN:  1. Current Discharge Medication List        2. Follow-up Information     Follow up With Details Comments 52 Elbert Mullen MD   [address] [place] [address] Daxa Ludwig [address]  [phone]          Return to ED if worse     Diagnosis     Clinical Impression:   1. Discharge from the vagina        Attestations: This note is prepared by Jose Luis Schwartz, acting as Scribe for Lori Reddy MD.    Lori Reddy MD: The scribe's documentation has been prepared under my direction and personally reviewed by me in its entirety. I confirm that the note above accurately reflects all work, treatment, procedures, and medical decision making performed by me.

## 2017-12-03 NOTE — ED NOTES
Pt presents ambulatory to ED complaining of lower abdominal back pain and urinary frequency X3 days. Pt is alert and oriented x 4, RR even and unlabored, skin is warm and dry. Assesment completed and pt updated on plan of care. Emergency Department Nursing Plan of Care       The Nursing Plan of Care is developed from the Nursing assessment and Emergency Department Attending provider initial evaluation. The plan of care may be reviewed in the ED Provider note.     The Plan of Care was developed with the following considerations:   Patient / Family readiness to learn indicated by:verbalized understanding  Persons(s) to be included in education: patient  Barriers to Learning/Limitations:No    Signed     Joya Ruiz RN    12/3/2017   3:07 AM

## 2017-12-03 NOTE — ED NOTES
Discharge instructions were given to the patient by Alesia Ortiz RN. The patient left the Emergency Department ambulatory, alert and oriented and in no acute distress with 0 prescriptions. The patient was encouraged to call or return to the ED for worsening issues or problems and was encouraged to schedule a follow up appointment for continuing care. The patient verbalized understanding of discharge instructions and prescriptions, all questions were answered. The patient has no further concerns at this time.

## 2017-12-04 LAB
C TRACH DNA SPEC QL NAA+PROBE: NEGATIVE
N GONORRHOEA DNA SPEC QL NAA+PROBE: NEGATIVE
SAMPLE TYPE: NORMAL
SERVICE CMNT-IMP: NORMAL
SPECIMEN SOURCE: NORMAL

## 2019-10-13 ENCOUNTER — HOSPITAL ENCOUNTER (EMERGENCY)
Age: 29
Discharge: HOME | End: 2019-10-13
Attending: EMERGENCY MEDICINE
Payer: COMMERCIAL

## 2019-10-13 VITALS
OXYGEN SATURATION: 99 % | DIASTOLIC BLOOD PRESSURE: 81 MMHG | HEIGHT: 64 IN | SYSTOLIC BLOOD PRESSURE: 122 MMHG | HEART RATE: 88 BPM | BODY MASS INDEX: 38.5 KG/M2 | TEMPERATURE: 98 F | RESPIRATION RATE: 16 BRPM | WEIGHT: 225.5 LBS

## 2019-10-13 DIAGNOSIS — N76.0 BV (BACTERIAL VAGINOSIS): Primary | ICD-10-CM

## 2019-10-13 DIAGNOSIS — N39.0 URINARY TRACT INFECTION WITHOUT HEMATURIA, SITE UNSPECIFIED: ICD-10-CM

## 2019-10-13 DIAGNOSIS — B96.89 BV (BACTERIAL VAGINOSIS): Primary | ICD-10-CM

## 2019-10-13 LAB
APPEARANCE UR: CLEAR
BACTERIA URNS QL MICRO: ABNORMAL /HPF
BILIRUB UR QL: NEGATIVE
CLUE CELLS VAG QL WET PREP: NORMAL
COLOR UR: ABNORMAL
EPITH CASTS URNS QL MICRO: ABNORMAL /LPF
GLUCOSE UR STRIP.AUTO-MCNC: NEGATIVE MG/DL
HCG UR QL: NEGATIVE
HGB UR QL STRIP: NEGATIVE
KETONES UR QL STRIP.AUTO: NEGATIVE MG/DL
KOH PREP SPEC: NORMAL
LEUKOCYTE ESTERASE UR QL STRIP.AUTO: ABNORMAL
NITRITE UR QL STRIP.AUTO: NEGATIVE
PH UR STRIP: 6.5 [PH] (ref 5–8)
PROT UR STRIP-MCNC: NEGATIVE MG/DL
RBC #/AREA URNS HPF: ABNORMAL /HPF (ref 0–5)
SERVICE CMNT-IMP: NORMAL
SP GR UR REFRACTOMETRY: 1.02 (ref 1–1.03)
T VAGINALIS VAG QL WET PREP: NORMAL
UA: UC IF INDICATED,UAUC: ABNORMAL
UROBILINOGEN UR QL STRIP.AUTO: 1 EU/DL (ref 0.2–1)
WBC URNS QL MICRO: ABNORMAL /HPF (ref 0–4)

## 2019-10-13 PROCEDURE — 87210 SMEAR WET MOUNT SALINE/INK: CPT

## 2019-10-13 PROCEDURE — 99283 EMERGENCY DEPT VISIT LOW MDM: CPT

## 2019-10-13 PROCEDURE — 81001 URINALYSIS AUTO W/SCOPE: CPT

## 2019-10-13 PROCEDURE — 87086 URINE CULTURE/COLONY COUNT: CPT

## 2019-10-13 PROCEDURE — 81025 URINE PREGNANCY TEST: CPT

## 2019-10-13 RX ORDER — NITROFURANTOIN 25; 75 MG/1; MG/1
100 CAPSULE ORAL 2 TIMES DAILY
Qty: 6 CAP | Refills: 0 | Status: SHIPPED | OUTPATIENT
Start: 2019-10-13 | End: 2019-10-16

## 2019-10-13 RX ORDER — METRONIDAZOLE 500 MG/1
500 TABLET ORAL 2 TIMES DAILY
Qty: 14 TAB | Refills: 0 | Status: SHIPPED | OUTPATIENT
Start: 2019-10-13 | End: 2019-10-13

## 2019-10-13 RX ORDER — METRONIDAZOLE 500 MG/1
500 TABLET ORAL 2 TIMES DAILY
Qty: 14 TAB | Refills: 0 | Status: SHIPPED | OUTPATIENT
Start: 2019-10-13 | End: 2019-10-20

## 2019-10-13 NOTE — DISCHARGE INSTRUCTIONS
Bacterial Vaginosis: Care Instructions  Your Care Instructions    Bacterial vaginosis is a type of vaginal infection. It is caused by excess growth of certain bacteria that are normally found in the vagina. Symptoms can include itching, swelling, pain when you urinate or have sex, and a gray or yellow discharge with a \"fishy\" odor. It is not considered an infection that is spread through sexual contact. Although symptoms can be annoying and uncomfortable, bacterial vaginosis does not usually cause other health problems. However, if you have it while you are pregnant, it can cause complications. While the infection may go away on its own, most doctors use antibiotics to treat it. You may have been prescribed pills or vaginal cream. With treatment, bacterial vaginosis usually clears up in 5 to 7 days. Follow-up care is a key part of your treatment and safety. Be sure to make and go to all appointments, and call your doctor if you are having problems. It's also a good idea to know your test results and keep a list of the medicines you take. How can you care for yourself at home? · Take your antibiotics as directed. Do not stop taking them just because you feel better. You need to take the full course of antibiotics. · Do not eat or drink anything that contains alcohol if you are taking metronidazole (Flagyl). · Keep using your medicine if you start your period. Use pads instead of tampons while using a vaginal cream or suppository. Tampons can absorb the medicine. · Wear loose cotton clothing. Do not wear nylon and other materials that hold body heat and moisture close to the skin. · Do not scratch. Relieve itching with a cold pack or a cool bath. · Do not wash your vaginal area more than once a day. Use plain water or a mild, unscented soap. Do not douche. When should you call for help?   Watch closely for changes in your health, and be sure to contact your doctor if:    · You have unexpected vaginal bleeding.     · You have a fever.     · You have new or increased pain in your vagina or pelvis.     · You are not getting better after 1 week.     · Your symptoms return after you finish the course of your medicine. Where can you learn more? Go to http://peter-danilo.info/. Pina Martinez in the search box to learn more about \"Bacterial Vaginosis: Care Instructions. \"  Current as of: February 19, 2019  Content Version: 12.2  © 0160-3889 [employer]. Care instructions adapted under license by [employer] (which disclaims liability or warranty for this information). If you have questions about a medical condition or this instruction, always ask your healthcare professional. [place] [address] any warranty or liability for your use of this information. Patient Education        Bacterial Vaginosis: Care Instructions  Your Care Instructions    Bacterial vaginosis is a type of vaginal infection. It is caused by excess growth of certain bacteria that are normally found in the vagina. Symptoms can include itching, swelling, pain when you urinate or have sex, and a gray or yellow discharge with a \"fishy\" odor. It is not considered an infection that is spread through sexual contact. Although symptoms can be annoying and uncomfortable, bacterial vaginosis does not usually cause other health problems. However, if you have it while you are pregnant, it can cause complications. While the infection may go away on its own, most doctors use antibiotics to treat it. You may have been prescribed pills or vaginal cream. With treatment, bacterial vaginosis usually clears up in 5 to 7 days. Follow-up care is a key part of your treatment and safety. Be sure to make and go to all appointments, and call your doctor if you are having problems. It's also a good idea to know your test results and keep a list of the medicines you take.   How can you care for yourself at home?  · Take your antibiotics as directed. Do not stop taking them just because you feel better. You need to take the full course of antibiotics. · Do not eat or drink anything that contains alcohol if you are taking metronidazole (Flagyl). · Keep using your medicine if you start your period. Use pads instead of tampons while using a vaginal cream or suppository. Tampons can absorb the medicine. · Wear loose cotton clothing. Do not wear nylon and other materials that hold body heat and moisture close to the skin. · Do not scratch. Relieve itching with a cold pack or a cool bath. · Do not wash your vaginal area more than once a day. Use plain water or a mild, unscented soap. Do not douche. When should you call for help? Watch closely for changes in your health, and be sure to contact your doctor if:    · You have unexpected vaginal bleeding.     · You have a fever.     · You have new or increased pain in your vagina or pelvis.     · You are not getting better after 1 week.     · Your symptoms return after you finish the course of your medicine. Where can you learn more? Go to http://peter-danilo.info/. Urmila Thomas in the search box to learn more about \"Bacterial Vaginosis: Care Instructions. \"  Current as of: February 19, 2019  Content Version: 12.2  © 4592-1343 [employer]. Care instructions adapted under license by [employer] (which disclaims liability or warranty for this information). If you have questions about a medical condition or this instruction, always ask your healthcare professional. [place] [address] any warranty or liability for your use of this information.

## 2019-10-13 NOTE — ED NOTES
Emergency Department Nursing Plan of Care       The Nursing Plan of Care is developed from the Nursing assessment and Emergency Department Attending provider initial evaluation. The plan of care may be reviewed in the ED Provider note.     The Plan of Care was developed with the following considerations:   Patient / Family readiness to learn indicated by:verbalized understanding  Persons(s) to be included in education: patient  Barriers to Learning/Limitations:No    Signed     Michelle Thibodeaux RN    10/13/2019   4:41 PM

## 2019-10-14 NOTE — ED PROVIDER NOTES
EMERGENCY DEPARTMENT HISTORY AND PHYSICAL EXAM    Date: 10/13/2019  Patient Name: Rachele Jain    History of Presenting Illness     Chief Complaint   Patient presents with    Back Pain     since yesterday    Vaginal Discharge     with abdominal pain          History Provided By: Patient    Chief Complaint: vaginal discharge  Duration: 3 Days  Timing:  Acute  Location: vaginal  Quality: white thin  Severity: Moderate  Modifying Factors: none  Associated Symptoms: low back pain      HPI: Rachele Jain is a 34 y.o. female with a PMH of No significant past medical history who presents with   Vaginal discharge for the past 3 days. Patient states she thinks she may have BV. Patient also endorses low back pain states she may have UTI. Patient denies concern for sexually transmitted infection. She denies abdominal pain dysuria flank pain hematuria nausea vomiting diarrhea fever. She has no other complaints at this time. PCP: Yesenia Singh MD    Current Outpatient Medications   Medication Sig Dispense Refill    nitrofurantoin, macrocrystal-monohydrate, (MACROBID) 100 mg capsule Take 1 Cap by mouth two (2) times a day for 3 days. 6 Cap 0    metroNIDAZOLE (FLAGYL) 500 mg tablet Take 1 Tab by mouth two (2) times a day for 7 days. 14 Tab 0    levonorgestrel-ethinyl estradiol (ANIBAL) 0.15-0.03 mg tab Take 1 Tab by mouth daily. 3 Tab 4    ASPIRIN/SALICYLAMIDE/CAFFEINE (BC HEADACHE POWDER PO) Take 1 Package by mouth. Takes for headache or menstrual cramps      VENTOLIN HFA 90 mcg/actuation inhaler   0       Past History     Past Medical History:  Past Medical History:   Diagnosis Date    Asthma     Pap smear for cervical cancer screening 03/17/2017    Negative (no hpv)     Trichomonas infection 12/13/2016    Dx'd at [facility] and was given Rx- Flagyl. Prev tx'd @ 15yo.        Past Surgical History:  Past Surgical History:   Procedure Laterality Date    HX GYN      D&C done by Dr. Butch Álvarez HYSTEROSCOPY  5/4/16    Hysteroscopy, D and C, and polypectomy with       Family History:  Family History   Problem Relation Age of Onset    Cancer Father         Throat CA       Social History:  Social History     Tobacco Use    Smoking status: Current Some Day Smoker     Types: Cigarettes    Smokeless tobacco: Never Used    Tobacco comment: Never used vapor or e-cigs    Substance Use Topics    Alcohol use: Yes     Alcohol/week: 1.0 standard drinks     Types: 1 Cans of beer per week     Comment: occasional    Drug use: No       Allergies: Allergies   Allergen Reactions    Penicillins Unable to Obtain     Yeast infections         Review of Systems   Review of Systems   Constitutional: Negative for fatigue and fever. Respiratory: Negative for shortness of breath and wheezing. Cardiovascular: Negative for chest pain and palpitations. Gastrointestinal: Negative for abdominal pain. Genitourinary: Positive for vaginal discharge. Musculoskeletal: Positive for back pain. Negative for arthralgias, myalgias, neck pain and neck stiffness. Skin: Negative for pallor and rash. Neurological: Negative for dizziness, tremors, weakness and headaches. Hematological: Negative for adenopathy. All other systems reviewed and are negative. Physical Exam     Vitals:    10/13/19 1542   BP: 122/81   Pulse: 88   Resp: 16   Temp: 98 °F (36.7 °C)   SpO2: 99%   Weight: 102.3 kg (225 lb 8 oz)   Height: 5' 4\" (1.626 m)     Physical Exam   Constitutional: She is oriented to person, place, and time. She appears well-developed and well-nourished. No distress. HENT:   Head: Normocephalic and atraumatic. Right Ear: External ear normal.   Left Ear: External ear normal.   Nose: Nose normal.   Mouth/Throat: Oropharynx is clear and moist.   Eyes: Conjunctivae are normal.   Neck: Normal range of motion. Neck supple. Cardiovascular: Normal rate, regular rhythm and normal heart sounds.    Pulmonary/Chest: Effort normal and breath sounds normal. No respiratory distress. She has no wheezes. Abdominal: Soft. Bowel sounds are normal. There is no tenderness. Musculoskeletal: Normal range of motion. Lymphadenopathy:     She has no cervical adenopathy. Neurological: She is alert and oriented to person, place, and time. No cranial nerve deficit. Coordination normal.   Skin: Skin is warm and dry. No rash noted. Psychiatric: She has a normal mood and affect. Her behavior is normal. Judgment and thought content normal.   Nursing note and vitals reviewed. Patient self swabbed to obtain STD cultures    Diagnostic Study Results     Labs -     Recent Results (from the past 12 hour(s))   HCG URINE, QL. - POC    Collection Time: 10/13/19  4:27 PM   Result Value Ref Range    Pregnancy test,urine (POC) NEGATIVE  NEG     KOH, OTHER SOURCES    Collection Time: 10/13/19  4:29 PM   Result Value Ref Range    Special Requests: NO SPECIAL REQUESTS      KOH NO YEAST SEEN     WET PREP    Collection Time: 10/13/19  4:29 PM   Result Value Ref Range    Clue cells CLUE CELLS PRESENT      Wet prep NO TRICHOMONAS SEEN     URINALYSIS W/ REFLEX CULTURE    Collection Time: 10/13/19  4:29 PM   Result Value Ref Range    Color YELLOW/STRAW      Appearance CLEAR CLEAR      Specific gravity 1.020 1.003 - 1.030      pH (UA) 6.5 5.0 - 8.0      Protein NEGATIVE  NEG mg/dL    Glucose NEGATIVE  NEG mg/dL    Ketone NEGATIVE  NEG mg/dL    Bilirubin NEGATIVE  NEG      Blood NEGATIVE  NEG      Urobilinogen 1.0 0.2 - 1.0 EU/dL    Nitrites NEGATIVE  NEG      Leukocyte Esterase TRACE (A) NEG      WBC 0-4 0 - 4 /hpf    RBC 0-5 0 - 5 /hpf    Epithelial cells MODERATE (A) FEW /lpf    Bacteria 1+ (A) NEG /hpf    UA:UC IF INDICATED URINE CULTURE ORDERED (A) CNI         Radiologic Studies -   No orders to display     CT Results  (Last 48 hours)    None        CXR Results  (Last 48 hours)    None            Medical Decision Making   I am the first provider for this patient.     I reviewed the vital signs, available nursing notes, past medical history, past surgical history, family history and social history. Vital Signs-Reviewed the patient's vital signs. Records Reviewed: Nursing Notes            Disposition:  home    DISCHARGE NOTE:       Care plan outlined and precautions discussed. Patient has no new complaints, changes, or physical findings. Results of tests were reviewed with the patient. All medications were reviewed with the patient; will d/c home with flagyl macrobid. All of pt's questions and concerns were addressed. Patient was instructed and agrees to follow up with PCP, as well as to return to the ED upon further deterioration. Patient is ready to go home. Follow-up Information     Follow up With Specialties Details Why Contact Info    Farrah Olguin MD Internal Medicine In 3 days   [facility]  [phone]            Discharge Medication List as of 10/13/2019  5:07 PM      START taking these medications    Details   nitrofurantoin, macrocrystal-monohydrate, (MACROBID) 100 mg capsule Take 1 Cap by mouth two (2) times a day for 3 days. , Print, Disp-6 Cap, R-0      metroNIDAZOLE (FLAGYL) 500 mg tablet Take 1 Tab by mouth two (2) times a day for 7 days. , Print, Disp-14 Tab, R-0         CONTINUE these medications which have NOT CHANGED    Details   levonorgestrel-ethinyl estradiol (ANIBAL) 0.15-0.03 mg tab Take 1 Tab by mouth daily. , Normal, Disp-3 Tab, R-4      ASPIRIN/SALICYLAMIDE/CAFFEINE (BC HEADACHE POWDER PO) Take 1 Package by mouth. Takes for headache or menstrual cramps, Historical Med      VENTOLIN HFA 90 mcg/actuation inhaler Historical Med, R-0, SHAHIDA             Provider Notes (Medical Decision Making):   DDX BV candidiasis UTI  Procedures:  Procedures    Please note that this dictation was completed with Dragon, computer voice recognition software.   Quite often unanticipated grammatical, syntax, homophones, and other interpretive errors are inadvertently transcribed by the computer software. Please disregard these errors. Additionally, please excuse any errors that have escaped final proofreading. Diagnosis     Clinical Impression:   1. BV (bacterial vaginosis)    2.  Urinary tract infection without hematuria, site unspecified

## 2019-10-15 LAB
BACTERIA SPEC CULT: NORMAL
CC UR VC: NORMAL
SERVICE CMNT-IMP: NORMAL

## 2025-06-18 ENCOUNTER — HOSPITAL ENCOUNTER (EMERGENCY)
Facility: HOSPITAL | Age: 35
Discharge: HOME | End: 2025-06-18
Attending: STUDENT IN AN ORGANIZED HEALTH CARE EDUCATION/TRAINING PROGRAM

## 2025-06-18 VITALS
OXYGEN SATURATION: 98 % | DIASTOLIC BLOOD PRESSURE: 101 MMHG | WEIGHT: 242.29 LBS | TEMPERATURE: 97.1 F | HEIGHT: 64 IN | BODY MASS INDEX: 41.36 KG/M2 | RESPIRATION RATE: 18 BRPM | SYSTOLIC BLOOD PRESSURE: 142 MMHG | HEART RATE: 83 BPM

## 2025-06-18 DIAGNOSIS — R53.83 OTHER FATIGUE: ICD-10-CM

## 2025-06-18 DIAGNOSIS — R42 LIGHTHEADEDNESS: Primary | ICD-10-CM

## 2025-06-18 LAB
ALBUMIN SERPL-MCNC: 3.7 G/DL (ref 3.5–5)
ALBUMIN/GLOB SERPL: 0.9 (ref 1.1–2.2)
ALP SERPL-CCNC: 47 U/L (ref 45–117)
ALT SERPL-CCNC: 20 U/L (ref 12–78)
ANION GAP SERPL CALC-SCNC: 3 MMOL/L (ref 2–12)
AST SERPL-CCNC: 22 U/L (ref 15–37)
BASOPHILS # BLD: 0.04 K/UL (ref 0–0.1)
BASOPHILS NFR BLD: 0.8 % (ref 0–1)
BILIRUB SERPL-MCNC: 0.5 MG/DL (ref 0.2–1)
BUN SERPL-MCNC: 12 MG/DL (ref 6–20)
BUN/CREAT SERPL: 12 (ref 12–20)
CALCIUM SERPL-MCNC: 9.2 MG/DL (ref 8.5–10.1)
CHLORIDE SERPL-SCNC: 102 MMOL/L (ref 97–108)
CO2 SERPL-SCNC: 26 MMOL/L (ref 21–32)
COMMENT:: NORMAL
CREAT SERPL-MCNC: 0.98 MG/DL (ref 0.55–1.02)
DIFFERENTIAL METHOD BLD: NORMAL
EKG ATRIAL RATE: 68 BPM
EKG DIAGNOSIS: NORMAL
EKG P AXIS: 44 DEGREES
EKG P-R INTERVAL: 180 MS
EKG Q-T INTERVAL: 422 MS
EKG QRS DURATION: 70 MS
EKG QTC CALCULATION (BAZETT): 448 MS
EKG R AXIS: 8 DEGREES
EKG T AXIS: 17 DEGREES
EKG VENTRICULAR RATE: 68 BPM
EOSINOPHIL # BLD: 0.11 K/UL (ref 0–0.4)
EOSINOPHIL NFR BLD: 2.2 % (ref 0–7)
ERYTHROCYTE [DISTWIDTH] IN BLOOD BY AUTOMATED COUNT: 13.2 % (ref 11.5–14.5)
FLUAV RNA SPEC QL NAA+PROBE: NOT DETECTED
FLUBV RNA SPEC QL NAA+PROBE: NOT DETECTED
GLOBULIN SER CALC-MCNC: 4 G/DL (ref 2–4)
GLUCOSE BLD STRIP.AUTO-MCNC: 104 MG/DL (ref 65–117)
GLUCOSE SERPL-MCNC: 96 MG/DL (ref 65–100)
HCG UR QL: NEGATIVE
HCT VFR BLD AUTO: 37.5 % (ref 35–47)
HGB BLD-MCNC: 12.2 G/DL (ref 11.5–16)
IMM GRANULOCYTES # BLD AUTO: 0.01 K/UL (ref 0–0.04)
IMM GRANULOCYTES NFR BLD AUTO: 0.2 % (ref 0–0.5)
LYMPHOCYTES # BLD: 2.1 K/UL (ref 0.8–3.5)
LYMPHOCYTES NFR BLD: 42.7 % (ref 12–49)
MCH RBC QN AUTO: 30.4 PG (ref 26–34)
MCHC RBC AUTO-ENTMCNC: 32.5 G/DL (ref 30–36.5)
MCV RBC AUTO: 93.5 FL (ref 80–99)
MONOCYTES # BLD: 0.61 K/UL (ref 0–1)
MONOCYTES NFR BLD: 12.4 % (ref 5–13)
NEUTS SEG # BLD: 2.05 K/UL (ref 1.8–8)
NEUTS SEG NFR BLD: 41.7 % (ref 32–75)
NRBC # BLD: 0 K/UL (ref 0–0.01)
NRBC BLD-RTO: 0 PER 100 WBC
PLATELET # BLD AUTO: 230 K/UL (ref 150–400)
PMV BLD AUTO: 11.1 FL (ref 8.9–12.9)
POTASSIUM SERPL-SCNC: 3.7 MMOL/L (ref 3.5–5.1)
PROT SERPL-MCNC: 7.7 G/DL (ref 6.4–8.2)
RBC # BLD AUTO: 4.01 M/UL (ref 3.8–5.2)
SARS-COV-2 RNA RESP QL NAA+PROBE: NOT DETECTED
SERVICE CMNT-IMP: NORMAL
SODIUM SERPL-SCNC: 131 MMOL/L (ref 136–145)
SOURCE: NORMAL
SPECIMEN HOLD: NORMAL
TROPONIN I SERPL HS-MCNC: <4 NG/L (ref 0–51)
TSH SERPL DL<=0.05 MIU/L-ACNC: 1.22 UIU/ML (ref 0.36–3.74)
WBC # BLD AUTO: 4.9 K/UL (ref 3.6–11)

## 2025-06-18 PROCEDURE — 87636 SARSCOV2 & INF A&B AMP PRB: CPT

## 2025-06-18 PROCEDURE — 84484 ASSAY OF TROPONIN QUANT: CPT

## 2025-06-18 PROCEDURE — 99284 EMERGENCY DEPT VISIT MOD MDM: CPT

## 2025-06-18 PROCEDURE — 93005 ELECTROCARDIOGRAM TRACING: CPT | Performed by: STUDENT IN AN ORGANIZED HEALTH CARE EDUCATION/TRAINING PROGRAM

## 2025-06-18 PROCEDURE — 84443 ASSAY THYROID STIM HORMONE: CPT

## 2025-06-18 PROCEDURE — 82962 GLUCOSE BLOOD TEST: CPT

## 2025-06-18 PROCEDURE — 80053 COMPREHEN METABOLIC PANEL: CPT

## 2025-06-18 PROCEDURE — 81025 URINE PREGNANCY TEST: CPT

## 2025-06-18 PROCEDURE — 85025 COMPLETE CBC W/AUTO DIFF WBC: CPT

## 2025-06-18 RX ORDER — ALBUTEROL SULFATE 0.63 MG/3ML
1 SOLUTION RESPIRATORY (INHALATION) EVERY 6 HOURS PRN
COMMUNITY

## 2025-06-18 ASSESSMENT — LIFESTYLE VARIABLES
HOW MANY STANDARD DRINKS CONTAINING ALCOHOL DO YOU HAVE ON A TYPICAL DAY: 1 OR 2
HOW OFTEN DO YOU HAVE A DRINK CONTAINING ALCOHOL: 2-4 TIMES A MONTH

## 2025-06-18 NOTE — ED NOTES
Condition stable  Patient discharged to home  Patient education was completed  Education taught to patient   Teaching method used was handout and verbal  Understanding of teaching was good  Patient was discharged ambulatory  Discharged with self to family's car  Valuables were given to patient remained in possession of belongings during stay.

## 2025-06-18 NOTE — DISCHARGE INSTRUCTIONS
You presented to the ED after feeling lightheaded and nearly passing out at work.  Workup here in the ED shows no anemia dehydration or electrolyte abnormality.  COVID and flu test were negative.  Suspect this is some other virus causing your symptoms.  Recommend rest for the next few days increase fluid intake and follow-up with PCP.

## 2025-06-22 NOTE — ED PROVIDER NOTES
instructions and summary of care provided in AVS: You presented to the ED after feeling lightheaded and nearly passing out at work.  Workup here in the ED shows no anemia dehydration or electrolyte abnormality.  COVID and flu test were negative.  Suspect this is some other virus causing your symptoms.  Recommend rest for the next few days increase fluid intake and follow-up with PCP.    The patient has been re-evaluated and feeling better. Patient is stable for discharge.  All available radiology and laboratory results have been reviewed with patient and/or available family.  Patient and/or family verbally conveyed their understanding and agreement of the patient's signs, symptoms, diagnosis, treatment and prognosis and additionally agree to follow-up as recommended in the discharge instructions or to return to the Emergency Department should their condition change or worsen prior to their follow-up appointment.  All questions have been answered and patient and/or available family express understanding.      Prescriptions provided to the patient:     Discharge Medication List as of 6/18/2025 12:18 PM           Clyde Wolf MD   Emergency Medicine Attending Physician          Clyde Wolf MD  06/22/25 0934